# Patient Record
Sex: MALE | Race: WHITE
[De-identification: names, ages, dates, MRNs, and addresses within clinical notes are randomized per-mention and may not be internally consistent; named-entity substitution may affect disease eponyms.]

---

## 2018-02-20 ENCOUNTER — HOSPITAL ENCOUNTER (OUTPATIENT)
Dept: HOSPITAL 62 - OD | Age: 53
End: 2018-02-20
Attending: INTERNAL MEDICINE
Payer: COMMERCIAL

## 2018-02-20 DIAGNOSIS — R05: Primary | ICD-10-CM

## 2018-02-20 PROCEDURE — 71046 X-RAY EXAM CHEST 2 VIEWS: CPT

## 2018-02-20 NOTE — RADIOLOGY REPORT (SQ)
EXAM DESCRIPTION:  CHEST PA/LATERAL



COMPLETED DATE/TIME:  2/20/2018 1:37 pm



REASON FOR STUDY:  COUGH



COMPARISON:  None.



NUMBER OF VIEWS:  Two view.



TECHNIQUE:  Frontal and lateral radiographic views of the chest acquired.



LIMITATIONS:  None.



FINDINGS:  LUNGS AND PLEURA:  No opacities, masses or pneumothorax. No pleural effusion.

MEDIASTINUM AND HILAR STRUCTURES:  No masses or contour abnormalities.

HEART AND VASCULATURE:  Heart normal size.  No evidence for failure.

BONY STRUCTURES:  No acute findings.

HARDWARE:  None.

OTHER:  No other significant finding.



IMPRESSION:  NO SIGNIFICANT RADIOGRAPHIC FINDING IN THE CHEST.



TECHNICAL DOCUMENTATION:  JOB ID:  [id number]

 2011 [employer]- All Rights Reserved